# Patient Record
Sex: MALE | URBAN - METROPOLITAN AREA
[De-identification: names, ages, dates, MRNs, and addresses within clinical notes are randomized per-mention and may not be internally consistent; named-entity substitution may affect disease eponyms.]

---

## 2024-04-22 ENCOUNTER — APPOINTMENT (OUTPATIENT)
Dept: RADIOLOGY | Facility: MEDICAL CENTER | Age: 34
End: 2024-04-22
Attending: EMERGENCY MEDICINE

## 2024-04-22 ENCOUNTER — HOSPITAL ENCOUNTER (EMERGENCY)
Facility: MEDICAL CENTER | Age: 34
End: 2024-04-22
Attending: EMERGENCY MEDICINE

## 2024-04-22 VITALS
DIASTOLIC BLOOD PRESSURE: 60 MMHG | WEIGHT: 150 LBS | RESPIRATION RATE: 12 BRPM | TEMPERATURE: 97.9 F | HEART RATE: 79 BPM | OXYGEN SATURATION: 98 % | SYSTOLIC BLOOD PRESSURE: 119 MMHG

## 2024-04-22 DIAGNOSIS — T50.901A ACCIDENTAL DRUG INGESTION, INITIAL ENCOUNTER: ICD-10-CM

## 2024-04-22 DIAGNOSIS — F15.10 METHAMPHETAMINE ABUSE (HCC): ICD-10-CM

## 2024-04-22 LAB
ALBUMIN SERPL BCP-MCNC: 4.8 G/DL (ref 3.2–4.9)
ALBUMIN/GLOB SERPL: 2 G/DL
ALP SERPL-CCNC: 91 U/L (ref 30–99)
ALT SERPL-CCNC: 39 U/L (ref 2–50)
ANION GAP SERPL CALC-SCNC: 15 MMOL/L (ref 7–16)
APAP SERPL-MCNC: <5 UG/ML (ref 10–30)
AST SERPL-CCNC: 34 U/L (ref 12–45)
BASOPHILS # BLD AUTO: 0.4 % (ref 0–1.8)
BASOPHILS # BLD: 0.06 K/UL (ref 0–0.12)
BILIRUB SERPL-MCNC: 1 MG/DL (ref 0.1–1.5)
BUN SERPL-MCNC: 11 MG/DL (ref 8–22)
CALCIUM ALBUM COR SERPL-MCNC: 8.7 MG/DL (ref 8.5–10.5)
CALCIUM SERPL-MCNC: 9.3 MG/DL (ref 8.5–10.5)
CHLORIDE SERPL-SCNC: 99 MMOL/L (ref 96–112)
CK SERPL-CCNC: 489 U/L (ref 0–154)
CO2 SERPL-SCNC: 24 MMOL/L (ref 20–33)
CREAT SERPL-MCNC: 0.77 MG/DL (ref 0.5–1.4)
EKG IMPRESSION: NORMAL
EOSINOPHIL # BLD AUTO: 0.03 K/UL (ref 0–0.51)
EOSINOPHIL NFR BLD: 0.2 % (ref 0–6.9)
ERYTHROCYTE [DISTWIDTH] IN BLOOD BY AUTOMATED COUNT: 40.5 FL (ref 35.9–50)
ETHANOL BLD-MCNC: <10.1 MG/DL
GFR SERPLBLD CREATININE-BSD FMLA CKD-EPI: 121 ML/MIN/1.73 M 2
GLOBULIN SER CALC-MCNC: 2.4 G/DL (ref 1.9–3.5)
GLUCOSE SERPL-MCNC: 110 MG/DL (ref 65–99)
HCT VFR BLD AUTO: 42.7 % (ref 42–52)
HGB BLD-MCNC: 15.2 G/DL (ref 14–18)
IMM GRANULOCYTES # BLD AUTO: 0.16 K/UL (ref 0–0.11)
IMM GRANULOCYTES NFR BLD AUTO: 1 % (ref 0–0.9)
LIPASE SERPL-CCNC: 6 U/L (ref 11–82)
LYMPHOCYTES # BLD AUTO: 1.17 K/UL (ref 1–4.8)
LYMPHOCYTES NFR BLD: 7.6 % (ref 22–41)
MCH RBC QN AUTO: 30.5 PG (ref 27–33)
MCHC RBC AUTO-ENTMCNC: 35.6 G/DL (ref 32.3–36.5)
MCV RBC AUTO: 85.7 FL (ref 81.4–97.8)
MONOCYTES # BLD AUTO: 1.1 K/UL (ref 0–0.85)
MONOCYTES NFR BLD AUTO: 7.2 % (ref 0–13.4)
NEUTROPHILS # BLD AUTO: 12.83 K/UL (ref 1.82–7.42)
NEUTROPHILS NFR BLD: 83.6 % (ref 44–72)
NRBC # BLD AUTO: 0 K/UL
NRBC BLD-RTO: 0 /100 WBC (ref 0–0.2)
PLATELET # BLD AUTO: 252 K/UL (ref 164–446)
PMV BLD AUTO: 9.5 FL (ref 9–12.9)
POTASSIUM SERPL-SCNC: 4 MMOL/L (ref 3.6–5.5)
PROT SERPL-MCNC: 7.2 G/DL (ref 6–8.2)
RBC # BLD AUTO: 4.98 M/UL (ref 4.7–6.1)
SALICYLATES SERPL-MCNC: <1 MG/DL (ref 15–25)
SODIUM SERPL-SCNC: 138 MMOL/L (ref 135–145)
TROPONIN T SERPL-MCNC: 7 NG/L (ref 6–19)
WBC # BLD AUTO: 15.4 K/UL (ref 4.8–10.8)

## 2024-04-22 PROCEDURE — 700111 HCHG RX REV CODE 636 W/ 250 OVERRIDE (IP): Mod: JZ | Performed by: EMERGENCY MEDICINE

## 2024-04-22 PROCEDURE — 70450 CT HEAD/BRAIN W/O DYE: CPT

## 2024-04-22 PROCEDURE — 84484 ASSAY OF TROPONIN QUANT: CPT

## 2024-04-22 PROCEDURE — 82077 ASSAY SPEC XCP UR&BREATH IA: CPT

## 2024-04-22 PROCEDURE — 96376 TX/PRO/DX INJ SAME DRUG ADON: CPT

## 2024-04-22 PROCEDURE — 83690 ASSAY OF LIPASE: CPT

## 2024-04-22 PROCEDURE — 36415 COLL VENOUS BLD VENIPUNCTURE: CPT

## 2024-04-22 PROCEDURE — 80179 DRUG ASSAY SALICYLATE: CPT

## 2024-04-22 PROCEDURE — 80143 DRUG ASSAY ACETAMINOPHEN: CPT

## 2024-04-22 PROCEDURE — 93005 ELECTROCARDIOGRAM TRACING: CPT | Performed by: EMERGENCY MEDICINE

## 2024-04-22 PROCEDURE — 82550 ASSAY OF CK (CPK): CPT

## 2024-04-22 PROCEDURE — 80053 COMPREHEN METABOLIC PANEL: CPT

## 2024-04-22 PROCEDURE — 85025 COMPLETE CBC W/AUTO DIFF WBC: CPT

## 2024-04-22 PROCEDURE — 96374 THER/PROPH/DIAG INJ IV PUSH: CPT

## 2024-04-22 PROCEDURE — 700105 HCHG RX REV CODE 258: Performed by: EMERGENCY MEDICINE

## 2024-04-22 PROCEDURE — 99285 EMERGENCY DEPT VISIT HI MDM: CPT

## 2024-04-22 RX ORDER — SODIUM CHLORIDE, SODIUM LACTATE, POTASSIUM CHLORIDE, CALCIUM CHLORIDE 600; 310; 30; 20 MG/100ML; MG/100ML; MG/100ML; MG/100ML
1000 INJECTION, SOLUTION INTRAVENOUS ONCE
Status: COMPLETED | OUTPATIENT
Start: 2024-04-22 | End: 2024-04-22

## 2024-04-22 RX ORDER — ONDANSETRON 2 MG/ML
4 INJECTION INTRAMUSCULAR; INTRAVENOUS ONCE
Status: COMPLETED | OUTPATIENT
Start: 2024-04-22 | End: 2024-04-22

## 2024-04-22 RX ADMIN — ONDANSETRON 4 MG: 2 INJECTION INTRAMUSCULAR; INTRAVENOUS at 14:58

## 2024-04-22 RX ADMIN — ONDANSETRON 4 MG: 2 INJECTION INTRAMUSCULAR; INTRAVENOUS at 20:03

## 2024-04-22 RX ADMIN — SODIUM CHLORIDE, POTASSIUM CHLORIDE, SODIUM LACTATE AND CALCIUM CHLORIDE 1000 ML: 600; 310; 30; 20 INJECTION, SOLUTION INTRAVENOUS at 14:58

## 2024-04-22 NOTE — ED TRIAGE NOTES
Pt to rm g36 .  Chief Complaint   Patient presents with    Drug Ingestion     Pt states he is not feeling well after smoking an unknown substance

## 2024-04-22 NOTE — ED PROVIDER NOTES
"ER Provider Note    Scribed for Tabatha Morrison M.d. by Farrah Duron. 4/22/2024  2:33 PM    Primary Care Provider: No primary care provider noted.    CHIEF COMPLAINT   Chief Complaint   Patient presents with    Drug Ingestion     Pt states he is not feeling well after smoking an unknown substance      EXTERNAL RECORDS REVIEWED  No records available for review.     HPI/ROS  LIMITATION TO HISTORY   Select: : None  OUTSIDE HISTORIAN(S):  None.    Thai Leyva is a 33 y.o. male who presents to the ED via EMS for evaluation of drug ingestion onset 3 hours ago. He describes [place] The patient reports that about 3 hours after smoking the methamphetamine, he was offered to smoke an unknown substance which he bought from a different supplier because this supplier had \"cheaper product\". The patient reports that he immediately began to feel sick after smoking the unknown substance. The patient reports that he was having mid chest pain, nausea, vomiting, dizziness and a headache. The patient states that he current only has a mild headache and nausea.  Patient has been vomiting here in the ER.  The chest pain is resolved.  Patient said he felt completely fine before smoking the second round of what he thought was methamphetamine.  Patient smokes methamphetamine several times a week and has for about a year.  However, the second round of methamphetamine that he smoked today was purchased from a different supplier and he believes it might not have been the same meth.  The patient denies any suicidal ideation or homicidal ideation. The patient does mention that when he smokes methamphetamine, he experiences auditory hallucinations and notes that he currently is experiencing this.  He says he hears \"people having orgasms\" when he smokes methamphetamine.  The patient denies any allergies to medications.     PAST MEDICAL HISTORY  History reviewed. No pertinent past " medical history.    SURGICAL HISTORY  History reviewed. No pertinent surgical history.    FAMILY HISTORY  History reviewed. No pertinent family history.    SOCIAL HISTORY   reports that he has never smoked. The patient has never used smokeless tobacco. The patient reports current alcohol use. He reports current drug use.    CURRENT MEDICATIONS  No current outpatient medications     ALLERGIES  Patient has no known allergies.    PHYSICAL EXAM  /70   Pulse 93   Temp 36.6 °C (97.8 °F) (Temporal)   Resp 16   Wt 68 kg (150 lb)   SpO2 97%     Constitutional: Disheveled and unkempt; Fidgity, No acute distress; Non-toxic appearance.   HENT: Normocephalic, atraumatic; Bilateral external ears normal; slightly dry mucous membranes.  Eyes: PERRL, EOMI, Conjunctiva normal. No discharge.   Neck:  Supple, nontender midline; No stridor; No nuchal rigidity.   Lymphatic: No cervical lymphadenopathy noted.   Cardiovascular: Regular rate and rhythm without murmurs, rubs, or gallop.   Thorax & Lungs: No respiratory distress, breath sounds clear to auscultation bilaterally without wheezing, rales or rhonchi. Nontender chest wall. No crepitus or subcutaneous air  Abdomen: Soft, nontender, bowel sounds normal. No obvious masses; No pulsatile masses; no rebound, guarding, or peritoneal signs.   Skin: Good color; warm and dry without rash or petechia.  Back: Nontender, No CVA tenderness.   Extremities: Distal radial, dorsalis pedis, posterior tibial pulses are equal bilaterally; No edema; Nontender calves or saphenous, No cyanosis, No clubbing.   Musculoskeletal: Good range of motion in all major joints. No tenderness to palpation or major deformities noted.   Neurologic: Alert & oriented x 4, clear speech, moves all extremities with full range of motion.  Psych: No suicidal or homicidal ideation.       DIAGNOSTIC STUDIES    EKG/LABS  Results for orders placed or performed during the hospital encounter of 04/22/24   CBC WITH  DIFFERENTIAL   Result Value Ref Range    WBC 15.4 (H) 4.8 - 10.8 K/uL    RBC 4.98 4.70 - 6.10 M/uL    Hemoglobin 15.2 14.0 - 18.0 g/dL    Hematocrit 42.7 42.0 - 52.0 %    MCV 85.7 81.4 - 97.8 fL    MCH 30.5 27.0 - 33.0 pg    MCHC 35.6 32.3 - 36.5 g/dL    RDW 40.5 35.9 - 50.0 fL    Platelet Count 252 164 - 446 K/uL    MPV 9.5 9.0 - 12.9 fL    Neutrophils-Polys 83.60 (H) 44.00 - 72.00 %    Lymphocytes 7.60 (L) 22.00 - 41.00 %    Monocytes 7.20 0.00 - 13.40 %    Eosinophils 0.20 0.00 - 6.90 %    Basophils 0.40 0.00 - 1.80 %    Immature Granulocytes 1.00 (H) 0.00 - 0.90 %    Nucleated RBC 0.00 0.00 - 0.20 /100 WBC    Neutrophils (Absolute) 12.83 (H) 1.82 - 7.42 K/uL    Lymphs (Absolute) 1.17 1.00 - 4.80 K/uL    Monos (Absolute) 1.10 (H) 0.00 - 0.85 K/uL    Eos (Absolute) 0.03 0.00 - 0.51 K/uL    Baso (Absolute) 0.06 0.00 - 0.12 K/uL    Immature Granulocytes (abs) 0.16 (H) 0.00 - 0.11 K/uL    NRBC (Absolute) 0.00 K/uL   COMP METABOLIC PANEL   Result Value Ref Range    Sodium 138 135 - 145 mmol/L    Potassium 4.0 3.6 - 5.5 mmol/L    Chloride 99 96 - 112 mmol/L    Co2 24 20 - 33 mmol/L    Anion Gap 15.0 7.0 - 16.0    Glucose 110 (H) 65 - 99 mg/dL    Bun 11 8 - 22 mg/dL    Creatinine 0.77 0.50 - 1.40 mg/dL    Calcium 9.3 8.5 - 10.5 mg/dL    Correct Calcium 8.7 8.5 - 10.5 mg/dL    AST(SGOT) 34 12 - 45 U/L    ALT(SGPT) 39 2 - 50 U/L    Alkaline Phosphatase 91 U/L    Total Bilirubin 1.0 0.1 - 1.5 mg/dL    Albumin 4.8 3.2 - 4.9 g/dL    Total Protein 7.2 6.0 - 8.2 g/dL    Globulin 2.4 1.9 - 3.5 g/dL    A-G Ratio 2.0 g/dL   LIPASE   Result Value Ref Range    Lipase 6 (L) 11 - 82 U/L   DIAGNOSTIC ALCOHOL   Result Value Ref Range    Diagnostic Alcohol <10.1 <10.1 mg/dL   Salicylate   Result Value Ref Range    Salicylates, Quant. <1.0 (L) 15.0 - 25.0 mg/dL   ACETAMINOPHEN   Result Value Ref Range    Acetaminophen -Tylenol <5.0 (L) 10.0 - 30.0 ug/mL   CREATINE KINASE   Result Value Ref Range    CPK Total 489 (H) 0 - 154 U/L    TROPONIN   Result Value Ref Range    Troponin T 7 6 - 19 ng/L   ESTIMATED GFR   Result Value Ref Range    GFR (CKD-EPI) 69 >60 mL/min/1.73 m 2   EKG (NOW)   Result Value Ref Range    Report       [facility] Emergency Dept.    Test Date:  2024  Pt Name:    WILD REGAN                   Department: ER  MRN:        [id number]                      Room:       [facility]  Gender:                                  Technician: [id number]  :        [id number]                   Requested By:JOSE LUIS MORRISON  Order #:    [id number]                    Reading MD: Jose Luis Morrison    Measurements  Intervals                                Axis  Rate:       102                          P:          79  NV:         162                          QRS:        79  QRSD:       87                           T:          82  QT:         353  QTc:        460    Interpretive Statements  Sinus tachycardia rate 102  Normal intervals  Normal intervals  No ST elevation or depression  Biatrial enlargement  Anteroseptal infarct, age indeterminate  No previous ECG available for comparison  Electronically Signed On 2024 17:15:54 PDT by Jose Luis Morrison     I have independently interpreted this EKG      RADIOLOGY/PROCEDURES   The attending emergency physician has independently interpreted the diagnostic imaging associated with this visit and am waiting the final reading from the radiologist.     My preliminary interpretation is a follows: ER MD is reviewed the patient's CT brain.  No obvious infiltrates.    Radiologist interpretation:  CT-HEAD W/O   Final Result         1.  No acute intracranial abnormality.             COURSE & MEDICAL DECISION MAKING     ASSESSMENT, COURSE AND PLAN  Care Narrative: Patient presents to the ER after smoking some bad methamphetamine.  Patient has been smoking methamphetamine several times a week for the last year.  He said he was low on money so he bought some methamphetamine from another supplier.  He was [place]  "[place]  His first shot of methamphetamine was methamphetamine that he has purchased before from a known person.  He said he felt fine after smoking that meth.  3 hours later he bought some meth from someone else.  Almost immediately after smoking that methamphetamine he started \"feeling sick.\"  He started having nausea and dizziness.  He said he felt like he was having chest pain.  He a little bit of a headache.  He started vomiting.  Here in the ER he is actively vomiting.  He was given 4 mg of Zofran with some improvement.  He was given IV fluids.  He been resting comfortably throughout his ED stay.  His chest pain resolved upon arrival to the ER.  He still complained of a little bit of a mild headache.  Laboratory evaluation reveals a slightly elevated white blood cell count which could be demargination from the vomiting as patient has had multiple episodes of vomiting.  EKG is nonacute.  Troponin is negative.  No concern for STEMI or non-STEMI at this time.  Lipase is normal.  Aspirin and Tylenol level are negative.  Alcohol level is negative.  I ordered urine drug screen.  Patient is threatened to leave AMA multiple times.  I strongly encouraged him to stay to get the CT scan of the brain since he complained of headache and was smoking methamphetamine.  He finally agreed to stay for CT scan.  CT scan was performed and is negative for any acute head bleed.  After the CT scan patient said he wanted to leave.  Vital signs are normal and stable.  He is feeling better.  He is given several rounds of antiemetics and some fluids.  Unfortunately we were not able to get the talk screen before the patient left.  I suspect it would have been positive for meth.  At this time patient is feeling better.  He walks with a steady gait.  He is alert and orient x 4.  He has been advised to stop using methamphetamine.  At this time I suspect perhaps he got some bad meth or some meth that was laced with some " other substance.  Patient is anxious to leave.  He says he already has a ride outside waiting for him.  At this time he will be discharged home.  He has been given strict return precautions and discharge instructions and he understands treatment plan and follow-up.    2:33 PM - Patient seen and examined at bedside. The patient was vomiting at this time. Will return at a later time to evaluate the patient. The patient will be medicated with LR bolus and Zofran 4 mg. Lab work will be ordered as well.     4:30 PM - The patient was reevaluated at bedside. This is a middle aged man who presents for evaluation of drug ingestion. The patient reports that he smoked methamphetamine and about 3 hours later smoked an unknown substance. After smoking the unknown substance is when the patient began to have nausea, vomiting, abdominal pain, headache, and dizziness. The patient currently still has a headache, but denies any other symptoms. Discussed plan of care, including performing imaging. Patient agrees to the plan of care.      5:20 PM - Nursing informed me that the patient is requesting to leave. The patient states that he does not want to wait for his CT scan.     6:06 PM - The patient was reevaluated at bedside. The patient did not leave against medical advice and will stay for the CT.    8:00 PM - The patient was reevaluated at bedside. The patient notes that he became nauseous and some more dizziness when they took him to CT, so they were unable to scan him.  We wrote for 4 more of Zofran and were able to get the CT scan.    8:42 PM - The patient was reevaluated at bedside. I informed the patient that his lab work and scans are reassuring. The patient states that he is feeling improved and would like to go home. Discussed discharge instructions and return precautions with the patient and they were cleared for discharge. Patient was given the opportunity to ask any further questions. He is comfortable with discharge at  this time.       Hydration: Based on the patient's presentation of Acute Vomiting the patient was given IV fluids. IV Hydration was used because oral hydration was not as rapid as required. Upon recheck following hydration, the patient was improved:.    ADDITIONAL PROBLEM LIST  Problem #1: Nausea, vomiting, dizziness, chest pain and headache after smoking a new methamphetamine that he purchased from a new supplier.    DISPOSITION AND DISCUSSIONS  I have discussed management of the patient with the following physicians and BURKE's:  None.    Discussion of management with other QHP or appropriate source(s): None     Escalation of care considered, and ultimately not performed: after discussion with the patient / family, they have elected to decline an escalation in care.  Patient wanted to leave before the urine drug screen could be obtained and sent to the lab.    Barriers to care at this time, including but not limited to: Patient does not have established PCP.     Decision tools and prescription drugs considered including, but not limited to:  Patient was given several rounds of antiemetics to help control his nausea and vomiting. .    FINAL DIAGNOSIS  1. Methamphetamine abuse (HCC) Acute   2. Accidental drug ingestion, initial encounter Acute      Farrah BUCIO (Scribe), am scribing for, and in the presence of, Tabatha Morrison M.D..    Electronically signed by: Farrah Duron (Latriceibyemi), 4/22/2024    ITabatha M.D. personally performed the services described in this documentation, as scribed by Farrah Duron in my presence, and it is both accurate and complete.     This dictation has been created using voice recognition software. The accuracy of the dictation is limited by the abilities of the software. I expect there may be some errors of grammar and possibly content. I made every attempt to manually correct the errors within my dictation. However, errors related to  voice recognition software may still exist and should be interpreted within the appropriate context.      The note accurately reflects work and decisions made by me.  Tabatha Morrison M.D.  4/23/2024  12:50 AM

## 2024-04-23 NOTE — ED NOTES
Bedside report received from off going RN Samantha, assumed care of patient.  POC discussed with patient. Call light within reach, all needs addressed at this time.     Fall risk interventions in place: Move the patient closer to the nurse's station, Patient's personal possessions are with in their safe reach, Place socks on patient, Place fall risk sign on patient's door, Give patient urinal if applicable, and Keep floor surfaces clean and dry (all applicable per [place] Fall risk assessment)   Continuous monitoring: Cardiac Leads, Pulse Ox, or Blood Pressure  IVF/IV medications: Not Applicable   Oxygen: Room Air  Bedside sitter: Not Applicable   Isolation: Not Applicable    Pt to and from quickly. Per CT staff pt unwilling to lay flat for CT and wants to go home. ERP updated.

## 2024-04-23 NOTE — ED NOTES
Patient discharged from [facility] ED to home. Discharge teaching completed at bedside and patient signature obtained. Discharge medications reviewed and verbalized by patient and/or family. All questions and concerns addressed. Follow up explained with return instructions. PIV removed. All patient belongings with pt at time of discharge. Patient ambulatory with steady gait at time of discharge to [facility]. Mode of transportation: vehicle

## 2024-04-23 NOTE — DISCHARGE INSTRUCTIONS
Stop using drugs!    Return to the ER for any recurrent nausea/vomiting, worsening dizziness or lightheadedness, recurrent chest discomfort, recurrent headache, fevers, chills, or for any concerns.    Follow-up with the [facility] clinic within the next 1 to 2 days.  Please call for appointment.